# Patient Record
Sex: FEMALE | Race: WHITE | NOT HISPANIC OR LATINO | Employment: OTHER
[De-identification: names, ages, dates, MRNs, and addresses within clinical notes are randomized per-mention and may not be internally consistent; named-entity substitution may affect disease eponyms.]

---

## 2021-03-17 ENCOUNTER — OFFICE VISIT (OUTPATIENT)
Age: 61
End: 2021-03-17

## 2021-04-14 ENCOUNTER — OFFICE VISIT (OUTPATIENT)
Age: 61
End: 2021-04-14

## 2021-06-01 ENCOUNTER — OFFICE VISIT (OUTPATIENT)
Age: 61
End: 2021-06-01

## 2021-08-02 ENCOUNTER — OFFICE VISIT (OUTPATIENT)
Age: 61
End: 2021-08-02

## 2021-11-01 ENCOUNTER — OFFICE VISIT (OUTPATIENT)
Age: 61
End: 2021-11-01

## 2021-12-19 ENCOUNTER — OFFICE VISIT (OUTPATIENT)
Age: 61
End: 2021-12-19

## 2022-04-26 ENCOUNTER — OFFICE VISIT (OUTPATIENT)
Dept: URBAN - METROPOLITAN AREA CLINIC 68 | Facility: CLINIC | Age: 62
End: 2022-04-26

## 2022-05-10 ENCOUNTER — OFFICE VISIT (OUTPATIENT)
Dept: URBAN - METROPOLITAN AREA CLINIC 68 | Facility: CLINIC | Age: 62
End: 2022-05-10

## 2022-05-27 ENCOUNTER — OFFICE VISIT (OUTPATIENT)
Dept: URBAN - METROPOLITAN AREA CLINIC 68 | Facility: CLINIC | Age: 62
End: 2022-05-27

## 2022-06-04 ENCOUNTER — TELEPHONE ENCOUNTER (OUTPATIENT)
Dept: URBAN - METROPOLITAN AREA CLINIC 68 | Facility: CLINIC | Age: 62
End: 2022-06-04

## 2022-06-04 RX ORDER — DEXLANSOPRAZOLE 60 MG/1
DEXILANT( 60MG ORAL  DAILY ) INACTIVE -HX ENTRY CAPSULE, DELAYED RELEASE ORAL DAILY
OUTPATIENT
Start: 2020-01-10

## 2022-06-04 RX ORDER — FAMOTIDINE 10 MG/1
PEPCID AC( 10MG ORAL  DAILY ) INACTIVE -HX ENTRY TABLET, FILM COATED ORAL DAILY
OUTPATIENT
Start: 2020-01-10

## 2022-06-05 ENCOUNTER — TELEPHONE ENCOUNTER (OUTPATIENT)
Dept: URBAN - METROPOLITAN AREA CLINIC 68 | Facility: CLINIC | Age: 62
End: 2022-06-05

## 2022-06-05 RX ORDER — ROSUVASTATIN CALCIUM 10 MG
CRESTOR( 10MG ORAL  DAILY ) ACTIVE -HX ENTRY TABLET ORAL DAILY
Status: ACTIVE | COMMUNITY
Start: 2020-02-18

## 2022-06-05 RX ORDER — OMEPRAZOLE 20 MG/1
OMEPRAZOLE( 20MG ORAL 20 MG BID ) ACTIVE -HX ENTRY CAPSULE, DELAYED RELEASE ORAL BID
Status: ACTIVE | COMMUNITY
Start: 2020-02-18

## 2022-06-25 ENCOUNTER — TELEPHONE ENCOUNTER (OUTPATIENT)
Age: 62
End: 2022-06-25

## 2022-06-25 RX ORDER — FAMOTIDINE 10 MG/1
PEPCID AC( 10MG ORAL  DAILY ) INACTIVE -HX ENTRY TABLET, FILM COATED ORAL DAILY
OUTPATIENT
Start: 2020-01-10

## 2022-06-25 RX ORDER — DEXLANSOPRAZOLE 60 MG/1
DEXILANT( 60MG ORAL  DAILY ) INACTIVE -HX ENTRY CAPSULE, DELAYED RELEASE ORAL DAILY
OUTPATIENT
Start: 2020-01-10

## 2022-06-25 RX ORDER — SODIUM PICOSULFATE, MAGNESIUM OXIDE, AND ANHYDROUS CITRIC ACID 10; 3.5; 12 MG/160ML; G/160ML; G/160ML
LIQUID ORAL AS DIRECTED
Qty: 160 | Refills: 0 | OUTPATIENT
Start: 2022-05-10 | End: 2022-05-11

## 2022-06-25 RX ORDER — CETIRIZINE HYDROCHLORIDE 10 MG/1
ZYRTEC( 10MG ORAL  DAILY ) INACTIVE -HX ENTRY TABLET, FILM COATED ORAL DAILY
OUTPATIENT
Start: 2022-05-10

## 2022-06-25 RX ORDER — ROSUVASTATIN CALCIUM 10 MG
CRESTOR( 10MG ORAL  DAILY ) INACTIVE -HX ENTRY TABLET ORAL DAILY
OUTPATIENT
Start: 2022-05-10

## 2022-06-26 ENCOUNTER — TELEPHONE ENCOUNTER (OUTPATIENT)
Age: 62
End: 2022-06-26

## 2022-06-26 RX ORDER — LORATADINE 5 MG/5 ML
CLARITIN( 10MG ORAL 1 DAILY ) ACTIVE -HX ENTRY SOLUTION, ORAL ORAL DAILY
Status: ACTIVE | COMMUNITY
Start: 2022-05-10

## 2022-06-26 RX ORDER — OMEPRAZOLE 20 MG/1
OMEPRAZOLE( 20MG ORAL 20 MG BID ) ACTIVE -HX ENTRY CAPSULE, DELAYED RELEASE ORAL BID
Status: ACTIVE | COMMUNITY
Start: 2022-05-10

## 2022-06-26 RX ORDER — ROSUVASTATIN CALCIUM 20 MG/1
ROSUVASTATIN CALCIUM( 20MG ORAL 1 DAILY ) ACTIVE -HX ENTRY TABLET, FILM COATED ORAL DAILY
Status: ACTIVE | COMMUNITY
Start: 2022-05-10

## 2022-07-08 ENCOUNTER — OFFICE VISIT (OUTPATIENT)
Dept: URBAN - METROPOLITAN AREA SURGERY CENTER 12 | Facility: SURGERY CENTER | Age: 62
End: 2022-07-08

## 2022-07-20 ENCOUNTER — TELEPHONE ENCOUNTER (OUTPATIENT)
Dept: URBAN - METROPOLITAN AREA CLINIC 68 | Facility: CLINIC | Age: 62
End: 2022-07-20

## 2022-07-26 ENCOUNTER — OFFICE VISIT (OUTPATIENT)
Dept: URBAN - METROPOLITAN AREA CLINIC 68 | Facility: CLINIC | Age: 62
End: 2022-07-26

## 2022-07-26 ENCOUNTER — DASHBOARD ENCOUNTERS (OUTPATIENT)
Age: 62
End: 2022-07-26

## 2022-07-26 RX ORDER — ROSUVASTATIN CALCIUM 10 MG
CRESTOR( 10MG ORAL  DAILY ) ACTIVE -HX ENTRY TABLET ORAL DAILY
Status: ACTIVE | COMMUNITY
Start: 2020-02-18

## 2022-07-26 RX ORDER — OMEPRAZOLE 20 MG/1
OMEPRAZOLE( 20MG ORAL 20 MG BID ) ACTIVE -HX ENTRY CAPSULE, DELAYED RELEASE ORAL BID
Status: ACTIVE | COMMUNITY
Start: 2020-02-18

## 2022-07-26 NOTE — HPI-MIGRATED HPI
Transition to Care : 62 y.o. WF with a history of GERD and SSBE who agrees to phone visit follow up of recent EGD and colonoscopy in 7/2022. She had a small hiatal hernia, mild gastritis. She had a tubular adenoma in the descending colon and IH. She denies any abdominal pain, no n/v, no gib. It is questionable whether she actually has Zamudio's esophagus in the past. Review of previous EGD in 2020 did not show Zamudio's. Her recent EGD was negative for Zamudio's. She had mild gastritis, negative H. pylori, no celiac disease.

## 2023-01-27 ENCOUNTER — TELEPHONE ENCOUNTER (OUTPATIENT)
Dept: URBAN - METROPOLITAN AREA CLINIC 68 | Facility: CLINIC | Age: 63
End: 2023-01-27

## 2023-02-18 ENCOUNTER — OFFICE VISIT (OUTPATIENT)
Dept: URBAN - METROPOLITAN AREA CLINIC 68 | Facility: CLINIC | Age: 63
End: 2023-02-18